# Patient Record
Sex: FEMALE | URBAN - METROPOLITAN AREA
[De-identification: names, ages, dates, MRNs, and addresses within clinical notes are randomized per-mention and may not be internally consistent; named-entity substitution may affect disease eponyms.]

---

## 2017-02-03 ENCOUNTER — CARE COORDINATION (OUTPATIENT)
Dept: ONCOLOGY | Facility: CLINIC | Age: 47
End: 2017-02-03

## 2017-02-03 NOTE — PROGRESS NOTES
Spoke with pt this morning after she left message on 1/31/17 asking about nutritional supplements. She has tried Elecare.    She reported that she saw Dr. Son who did not agree with Dr. Richards that she has a mast cell disease.  He suggested food trials in office and to see psychologist.  She expressed her frustration on situation.    Currently is on way to see a new physician, Dr. Adán Mendieta, hem/on,recommended by her local physician.    Local physician is working with her to help find other physicians to help care for her.

## 2017-02-09 ENCOUNTER — CARE COORDINATION (OUTPATIENT)
Dept: ONCOLOGY | Facility: CLINIC | Age: 47
End: 2017-02-09

## 2017-02-09 NOTE — PROGRESS NOTES
Spoke with pt this afternoon after she had conversation with colleague in triage.    Pt reports that she is not coming to appt next week.  She has gotten so much worse then when she was seen last year re: no progress.    Pulmonologist yesterday told her she does not have mast cell and that it is really rare.  Also stated will not retest.   wants to treat her as asthma and have her see allergist to try medications in office.    Informed pt that in Dr. Richards's experience patients tolerate Neokate, Jr, and Elecare, Jr.  Pt reported she tried the Elecare, Jr.  Also reported that she has allergy to corn so not tried the Neokate.  Also discussed another possible resource Josiah Carranza DO.    Encouraged her to communicate with Dr. Richards or local PCP regarding her other medical questions (numerous).

## 2017-02-20 ENCOUNTER — TELEPHONE (OUTPATIENT)
Dept: ONCOLOGY | Facility: CLINIC | Age: 47
End: 2017-02-20

## 2017-02-20 NOTE — TELEPHONE ENCOUNTER
Patient reports feeling faint today, like she might pass out. Went to ED on 2-17 after feeling very ill and reports  which she says is very high for her (per patient her BS usually 60-70s). Per patient ED could not help her as they do not have knowledge to treat her. She is set up with physician recommended by Dr. Richards for an appt tomorrow. She only eats greek yogurt (eats 4 containers which each contain 2 servings) 9g sugar per serving (72 g sugar total daily). Has been eating this for the past couple weeks. Advised she should go in and at least get some lab work done. She agreed and will call her PCP to place lab orders. Patient has Dr. Richards's email and office number [phone], that he provided her via [facility]. Local physicians are welcome to contact Dr. Richards for advice on treatment.

## 2017-02-23 ENCOUNTER — CARE COORDINATION (OUTPATIENT)
Dept: ONCOLOGY | Facility: CLINIC | Age: 47
End: 2017-02-23

## 2017-02-23 NOTE — PROGRESS NOTES
"Pt left message this afternoon stating she can not get any care in her area unless she tries food challenges in allergists office.    1. Stated she thought Dr. Richards told her this was not safe way for her to go.  However, pt also stated her PCP, Dr. Palomino spoke with Dr. Richards and told her that Dr. Richards encouraged food challenges.  2. Also asking what is a \"milder\" food to try (for less severe reaction).  Noted that she is allergic to bananas, oats, wheat, corn, and soy; virtually every think someone can think of.      2/24/17:  Called pt to discuss that Dr. Richards communicated to author that ok for her to do food medications.  Also stated that choose of what food to try initially would best be determined by what food has given her a less severe reaction in past.  Pt reported that she saw new allergist this morning: Dr. Maldonado, allergist.  She thought he was very pleasant, interested in her situation and willing to work with Dr. Richards.  Did order additional lab testing (including tryptase) and pulmonary testing.  Will meet with him again in about 2 weeks.  Also reported a patient advocate from [facility] called and set her up with 2 doctors at [facility] who see mast cell disease patients.  Feeling that things are starting to look for her.  Stated her daughter is actress and recently on [employer] show \"[employer]\".  Daughter is wanting to bring awareness to mast cell disease patients.  "

## 2017-03-05 ENCOUNTER — TELEPHONE (OUTPATIENT)
Dept: NURSING | Facility: CLINIC | Age: 47
End: 2017-03-05

## 2017-03-05 NOTE — TELEPHONE ENCOUNTER
"Call Type: Triage Call    Presenting Problem: Caller  states that she is currently hospitalized  at  [facility] in [place], [place] and is about to receive an IV of  'saline and sugar\" for dehyration and  inquires if this is  compatible with her present conditions. Advised that [facility] cannot  provide  triage or medical information out of the satate due to  licensing laws. Advised that if current providers  are unable to  answer her questions they should contact her provider atthe   directly.  Patient does protest and state she has received advice  from [facility] before; advised that she should reveal her out of state  status and that she is not currently under direct care of her former  providers. Advised that [facility] does not have any specific information  regarding her medical condition or special needs that would provide  triage per ususal protocols.  Triage Note:  Guideline Title: Information Only Call; No Symptom Triage (Adult)  Recommended Disposition: Call Provider When Office is Open  Original Inclination:  Override Disposition:  Intended Action:  Physician Contacted: No  Requesting information and provider is best resource; no triage required. ?  YES  Requesting regular office appointment ? NO  Sign(s) or symptom(s) associated with a diagnosed condition or with a new illness  ? NO  Requesting information about provider, services or community resources ? NO  Call back to complete assessment/clarification of information from prior caller to  complete triage ? NO  Physician Instructions:  Care Advice:  "

## 2017-03-06 ENCOUNTER — CARE COORDINATION (OUTPATIENT)
Dept: ONCOLOGY | Facility: CLINIC | Age: 47
End: 2017-03-06

## 2017-03-06 NOTE — PROGRESS NOTES
Received message a Dr. Pradeep Reyes (sp?) left at 4:00 today asking to speak with Dr. Shaun Richards regarding pt who is hospitalized in [facility] (in [place]).  He is looking for guidance on helping pt.  His ph: [phone].  Also received message from pt (at 5:03) that Dr. Reyes was going to reach out to Dr. Richards.  She stated she has been hospitalized for a couple days.  In basket message sent as well as page (with info).

## 2017-03-07 NOTE — PROGRESS NOTES
Left message for pt today, 3/7/17, to let her know did get her message yesterday and that Liv Reyes and Susie did communicate yesterday.

## 2017-04-12 ENCOUNTER — CARE COORDINATION (OUTPATIENT)
Dept: ONCOLOGY | Facility: CLINIC | Age: 47
End: 2017-04-12

## 2017-04-13 NOTE — PROGRESS NOTES
Spoke with pt this afternoon.  She has tried to work with local physicians but she is not finding them helpful.  Had been seen in Dr. Josiah Carranza's office but not by her.  Was advised to come receive a celation infusion but pt reports clinic is full of a wide variety of scents because it is sent up like a spa.  Stated she would not tolerate the odors.  Furstrated with not being able to get help.  Stated PCP is only one who has expressed interest in helping her but PCP wants Dr. Richards to do the work of figuring out what pt can tolerate.  Pt stated she understood flying to [place] is not really answer.  She verbalized understanding that it could take months if not longer to get her stabilized.  Wants to live life rather than just survive day to day.

## 2017-07-25 ENCOUNTER — TELEPHONE (OUTPATIENT)
Dept: ONCOLOGY | Facility: CLINIC | Age: 47
End: 2017-07-25

## 2017-07-25 NOTE — TELEPHONE ENCOUNTER
"Pt wanted to update Dr. Richards that she is once again hospitalized in [place] for \"mast cell crisis\". She state local cardiologist Dr. Kincaid will reach out to Dr. Richards for suggestions. Her main concerns at this time is that she was started on ranitidine 0.75 ml BID and ketotifen 0.25ml x6 days ago. She is now experiencing uncontrollable crying, labile emotions, irritability and fatigue. Pt state this is extremely out of character for her.     Advised pt Dr. Richards will be unable to dose or participate in her care from [place]. Dr. Kincaid has Dr. Richards's email and office number [phone] and welcome to contact for advice. Pt verbalized understanding.  "

## 2017-12-31 ENCOUNTER — HEALTH MAINTENANCE LETTER (OUTPATIENT)
Age: 47
End: 2017-12-31

## 2020-03-10 ENCOUNTER — HEALTH MAINTENANCE LETTER (OUTPATIENT)
Age: 50
End: 2020-03-10

## 2020-12-27 ENCOUNTER — HEALTH MAINTENANCE LETTER (OUTPATIENT)
Age: 50
End: 2020-12-27

## 2021-04-24 ENCOUNTER — HEALTH MAINTENANCE LETTER (OUTPATIENT)
Age: 51
End: 2021-04-24

## 2021-10-04 ENCOUNTER — HEALTH MAINTENANCE LETTER (OUTPATIENT)
Age: 51
End: 2021-10-04

## 2022-05-15 ENCOUNTER — HEALTH MAINTENANCE LETTER (OUTPATIENT)
Age: 52
End: 2022-05-15

## 2022-09-11 ENCOUNTER — HEALTH MAINTENANCE LETTER (OUTPATIENT)
Age: 52
End: 2022-09-11

## 2023-06-03 ENCOUNTER — HEALTH MAINTENANCE LETTER (OUTPATIENT)
Age: 53
End: 2023-06-03